# Patient Record
Sex: FEMALE | Race: WHITE | Employment: FULL TIME | URBAN - METROPOLITAN AREA
[De-identification: names, ages, dates, MRNs, and addresses within clinical notes are randomized per-mention and may not be internally consistent; named-entity substitution may affect disease eponyms.]

---

## 2018-10-22 ENCOUNTER — TRANSFERRED RECORDS (OUTPATIENT)
Dept: HEALTH INFORMATION MANAGEMENT | Facility: CLINIC | Age: 33
End: 2018-10-22

## 2019-04-18 ENCOUNTER — TRANSFERRED RECORDS (OUTPATIENT)
Dept: HEALTH INFORMATION MANAGEMENT | Facility: CLINIC | Age: 34
End: 2019-04-18

## 2019-04-25 ENCOUNTER — TRANSFERRED RECORDS (OUTPATIENT)
Dept: HEALTH INFORMATION MANAGEMENT | Facility: CLINIC | Age: 34
End: 2019-04-25

## 2019-06-20 ENCOUNTER — MEDICAL CORRESPONDENCE (OUTPATIENT)
Dept: HEALTH INFORMATION MANAGEMENT | Facility: CLINIC | Age: 34
End: 2019-06-20

## 2019-08-29 ENCOUNTER — OFFICE VISIT (OUTPATIENT)
Dept: RHEUMATOLOGY | Facility: CLINIC | Age: 34
End: 2019-08-29
Payer: COMMERCIAL

## 2019-08-29 VITALS
BODY MASS INDEX: 20.84 KG/M2 | SYSTOLIC BLOOD PRESSURE: 111 MMHG | HEIGHT: 67 IN | WEIGHT: 132.8 LBS | DIASTOLIC BLOOD PRESSURE: 77 MMHG | OXYGEN SATURATION: 99 % | HEART RATE: 75 BPM

## 2019-08-29 DIAGNOSIS — N60.01 CYST OF RIGHT BREAST: ICD-10-CM

## 2019-08-29 DIAGNOSIS — M34.9 SCLEREDEMA (H): Primary | ICD-10-CM

## 2019-08-29 LAB — HBA1C MFR BLD: 4.8 % (ref 0–5.6)

## 2019-08-29 PROCEDURE — 36415 COLL VENOUS BLD VENIPUNCTURE: CPT | Performed by: STUDENT IN AN ORGANIZED HEALTH CARE EDUCATION/TRAINING PROGRAM

## 2019-08-29 PROCEDURE — 99204 OFFICE O/P NEW MOD 45 MIN: CPT | Performed by: STUDENT IN AN ORGANIZED HEALTH CARE EDUCATION/TRAINING PROGRAM

## 2019-08-29 PROCEDURE — 00000402 ZZHCL STATISTIC TOTAL PROTEIN: Performed by: STUDENT IN AN ORGANIZED HEALTH CARE EDUCATION/TRAINING PROGRAM

## 2019-08-29 PROCEDURE — 83036 HEMOGLOBIN GLYCOSYLATED A1C: CPT | Performed by: STUDENT IN AN ORGANIZED HEALTH CARE EDUCATION/TRAINING PROGRAM

## 2019-08-29 PROCEDURE — 84165 PROTEIN E-PHORESIS SERUM: CPT | Performed by: STUDENT IN AN ORGANIZED HEALTH CARE EDUCATION/TRAINING PROGRAM

## 2019-08-29 ASSESSMENT — ROUTINE ASSESSMENT OF PATIENT INDEX DATA (RAPID3)
RAPID3 INTERPRETATION: REMISSION
TOTAL RAPID3 SCORE: 1.3

## 2019-08-29 ASSESSMENT — PAIN SCALES - GENERAL: PAINLEVEL: NO PAIN (0)

## 2019-08-29 ASSESSMENT — MIFFLIN-ST. JEOR: SCORE: 1337.07

## 2019-08-29 NOTE — PATIENT INSTRUCTIONS
-- Your biopsy findings show that it is Scleredema which is different from Scleroderma.     -- I will get Hb A 1 c test and SPEP today. It can be associated with diabetes    -- Your jamey is negative. I did not find any evidence of any autoimmune connective tissue disease.     -- I recommend to be evaluated by dermatology    -- RTC on as needed basis.

## 2019-08-29 NOTE — NURSING NOTE
Joon Stallworth's goals for this visit include:   She requests these members of her care team be copied on today's visit information:     PCP: No Ref-Primary, Physician    Referring Provider:  Alison Avila  [address] [address]

## 2019-08-29 NOTE — PROGRESS NOTES
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 Stallworth is 34 year old female seen in clinic for evaluation of possible Scleroderma.     Left Breast lesion : She developed induration in her left breast 6 months ago.  It has not changed or grown in size.  She underwent biopsy of that lesion which revealed \" mildly thickened collagen fibers with superficial and deep dermal edema and vascular telangiectasias.  The epidermis is unremarkable.  There is minimal inflammation.  Collagen fibers in the mid and the deep dermis demonstrate mild thickening and conspicuous separation by edema fluid. Colloidal iron stain demonstrates increased dermal mucin. These findings are suggestive of scleredema.  Biopsy findings are not consistent with Scleroderma or morphea, there is no fibroblastic proliferation which can be seen in scleromyxedema.    She denies raynaud's, photosensitivity, oral/nasal mucosal sores, sicca symptoms, pleurisy, fever, weight loss, chills.  She denies joint pains, muscle weakness or myalgias.  She denies any history of psoriasis.    Scleredema is associated with diabetes, monoclonal gammopathy or after streptococcal infection. Most likely seen in the upper back or interscapular areas.  Scleredema usually resolves with the treatment of underlying cause.  It is one of the scleroderma mimic but not an autoimmune condition.    CARI test done in June 2019 was negative.  No more autoimmune testing is required at this time in the absence of systemic symptoms.    She can follow-up with dermatologist for further management of scleredema if needed.  She has a breast lesion " "present in the right breast 4 o'clock position.  I recommended her to contact her primary care provider for further evaluation.    Plan    1.  Blood test: Ordered HbA1c and serum protein electrophoresis    2.  Likelihood of CARI associated autoimmune connective tissue disease like scleroderma, morphea is less likely.  Her biopsy findings are consistent with scleredema.    3.  Patient can follow-up with Dermatology.    4.  Return to clinic on as-needed basis.              Active Problem List:     Patient Active Problem List    Diagnosis Date Noted     Breast cyst      Priority: Medium            History of Present Illness:   Joon Stallworth is a 34 year old female with no significant PMH seen in the clinic in consultation at request of Dr Avila for evaluation of joint pains.     She noticed 6 months ago that the skin texture over the inner lower part of the breast was different. She underwent breast ultrasound on 4/19/19 which showed \"  thickening of the left breast at 8 o'clock position.  There is some clear/cystic change deep to the thickened skin.  Uncertain etiology but asymmetric skin thickening does raise a concern for malignancy \".  She underwent ultrasound-guided breast aspiration as well as punch biopsy of the skin of the left breast.  The biopsy results revealed \" mildly thickened collagen fibers with superficial and deep dermal edema and vascular telangiectasia.  No tumor is seen.  The epidermis is unremarkable.  There is inflammation.  Collagen fibers in the mid and the deep dermis to moderate mild thickening and conspicuous appreciable edema..  Colloidal iron stain demonstrate increased dermal mucin.  Stain is negative for fungal microorganisms and increased dermal mucin.  The overall findings are most suggestive of scleredema \".    She has also noticed a small 1 x 2 cm pea-sized cystic lesion in the right breast 6 weeks ago.  It is not tender, not associated with chronic skin " inflammation.    She has meniscal surgery on her right knee 2 weeks.  Denies any joint pains, muscle weakness, raynaud's, skin rashes, photosensitivity, oral/nasal mucosal sores, pleurisy. No history of psoriasis, ulcerative colitis or chron's disease.  Denies any hx of diabetes, strep throat. No fever, chills, night sweats.          Review of Systems:     Review Of Systems  Constitutional: denies fever, chills, night sweats and weight loss.  Skin: Skin rash over the left breast   Eyes: No dryness or irritation in eyes. No episode of eye inflammation or redness.   Ears/Nose/Throat: no recurrent sinus infections.  Respiratory: No shortness of breath, dyspnea on exertion, cough, or hemoptysis  Cardiovascular: no chest pain or palpitations.  Gastrointestinal: no nausea, vomiting, abdominal pain.  Normal bowel movements.  Genitourinary: no dysuria, frequency  or hematuria.  Musculoskeletal: as in HPI  Neurologic: no numbness, tingling.  Psychiatric: no mood disorders.  Hematologic/Lymphatic/Immunologic: no history of easy bruising, petechia or purpura.  No abnormal bleeding.   Endocrine: no h/o thyroid disease or Diabetes.                  Past Medical History:     Past Medical History:   Diagnosis Date     Breast cyst      Past Surgical History:   Procedure Laterality Date     XR KNEE SURGERY JOSEP RIGHT              Social History:     Social History     Occupational History     Not on file   Tobacco Use     Smoking status: Never Smoker     Smokeless tobacco: Never Used   Substance and Sexual Activity     Alcohol use: Not on file     Drug use: Not on file     Sexual activity: Not on file            Family History:     Family History   Problem Relation Age of Onset     Diabetes Mother             Allergies:   No Known Allergies         Medications:     Current Outpatient Medications   Medication Sig Dispense Refill     Prenatal Multivit-Min-Fe-FA (PRENATAL VITAMINS PO)               Physical Exam:   Blood pressure  "111/77, pulse 75, height 1.689 m (5' 6.5\"), weight 60.2 kg (132 lb 12.8 oz), SpO2 99 %.  Wt Readings from Last 4 Encounters:   08/29/19 60.2 kg (132 lb 12.8 oz)       Constitutional: well-developed, appearing stated age; cooperative  Eyes: nl EOM, PERRLA, vision, conjunctiva, sclera  ENT: nl external ears, nose, hearing, lips, teeth, gums, throat  No mucous membrane lesions, normal saliva pool  Neck: no mass or thyroid enlargement  Resp: lungs clear to auscultation, nl to palpation  CV: RRR, no murmurs, rubs or gallops, no edema  GI: no ABD mass or tenderness, no HSM  : not tested  Lymph: no cervical, supraclavicular, inguinal or epitrochlear nodes    MS: All TMJ, neck, shoulder, elbow, wrist, MCP/PIP/DIP, spine, hip, knee, ankle, and foot MTP/IP joints were examined.   -- No active synovitis or deformity. Full ROM.  Normal  strength.   -- No dactylitis,  tenosynovitis, enthesopathy.    Skin: no nail pitting, alopecia, rash, nodules or lesions. She has indurated lesion about 3x 2 cm over the 8 o'clock position of the left breast.  Overlying skin has no erythema.   Neuro: nl cranial nerves, strength, sensation, DTRs.   Psych: nl judgement, orientation, memory, affect.         Data:     Results for orders placed or performed in visit on 08/29/19   Hemoglobin A1c   Result Value Ref Range    Hemoglobin A1C 4.8 0 - 5.6 %       Outside studies reviewed:     ANTI NUCLEAR ANTIBODY SCREEN WITH REFLEX (06/19/2019 10:58 AM CDT)  ANTI NUCLEAR ANTIBODY SCREEN WITH REFLEX (06/19/2019 10:58 AM CDT)   Component Value Ref Range Performed At Pathologist Signature   CARI Screen Quant 0.28 0.00 - 0.99 Units [facility]     CARI Screen EIA Negative Negative [facility]       ANTI NUCLEAR ANTIBODY SCREEN WITH REFLEX (06/19/2019 10:58 AM CDT)   Specimen   Blood     ANTI NUCLEAR ANTIBODY SCREEN WITH REFLEX (06/19/2019 10:58 AM CDT)   Performing Organization Address City/State/Zipcode Phone Number   ED " [address] [address] [phone]           Reviewed Rheumatology lab flowsheet    Thaddeus Bee MD  [facility]  Department of Rheumatology & Autoimmune Disorders  [place]: [phone]   Pager - [phone]

## 2019-08-30 LAB
ALBUMIN SERPL ELPH-MCNC: 4.7 G/DL (ref 3.7–5.1)
ALPHA1 GLOB SERPL ELPH-MCNC: 0.2 G/DL (ref 0.2–0.4)
ALPHA2 GLOB SERPL ELPH-MCNC: 0.6 G/DL (ref 0.5–0.9)
B-GLOBULIN SERPL ELPH-MCNC: 0.7 G/DL (ref 0.6–1)
GAMMA GLOB SERPL ELPH-MCNC: 0.9 G/DL (ref 0.7–1.6)
M PROTEIN SERPL ELPH-MCNC: 0 G/DL
PROT PATTERN SERPL ELPH-IMP: NORMAL

## 2019-10-22 ENCOUNTER — TRANSFERRED RECORDS (OUTPATIENT)
Dept: HEALTH INFORMATION MANAGEMENT | Facility: CLINIC | Age: 34
End: 2019-10-22